# Patient Record
Sex: MALE | URBAN - METROPOLITAN AREA
[De-identification: names, ages, dates, MRNs, and addresses within clinical notes are randomized per-mention and may not be internally consistent; named-entity substitution may affect disease eponyms.]

---

## 2023-02-10 ENCOUNTER — MEDICAL CORRESPONDENCE (OUTPATIENT)
Dept: HEALTH INFORMATION MANAGEMENT | Facility: CLINIC | Age: 68
End: 2023-02-10

## 2023-02-10 ENCOUNTER — LAB REQUISITION (OUTPATIENT)
Dept: LAB | Facility: CLINIC | Age: 68
End: 2023-02-10
Payer: COMMERCIAL

## 2023-02-10 ENCOUNTER — TRANSFERRED RECORDS (OUTPATIENT)
Dept: HEALTH INFORMATION MANAGEMENT | Facility: CLINIC | Age: 68
End: 2023-02-10

## 2023-02-10 DIAGNOSIS — Z11.59 ENCOUNTER FOR SCREENING FOR OTHER VIRAL DISEASES: ICD-10-CM

## 2023-02-10 DIAGNOSIS — R74.8 ABNORMAL LEVELS OF OTHER SERUM ENZYMES: ICD-10-CM

## 2023-02-10 DIAGNOSIS — Z12.5 ENCOUNTER FOR SCREENING FOR MALIGNANT NEOPLASM OF PROSTATE: ICD-10-CM

## 2023-02-10 LAB
ALBUMIN SERPL BCG-MCNC: 4.3 G/DL (ref 3.5–5.2)
ALP SERPL-CCNC: 78 U/L (ref 40–129)
ALT SERPL W P-5'-P-CCNC: 33 U/L (ref 10–50)
ANION GAP SERPL CALCULATED.3IONS-SCNC: 14 MMOL/L (ref 7–15)
AST SERPL W P-5'-P-CCNC: 29 U/L (ref 10–50)
BILIRUB SERPL-MCNC: 0.7 MG/DL
BUN SERPL-MCNC: 12.6 MG/DL (ref 8–23)
CALCIUM SERPL-MCNC: 8.7 MG/DL (ref 8.8–10.2)
CHLORIDE SERPL-SCNC: 103 MMOL/L (ref 98–107)
CHOLEST SERPL-MCNC: 173 MG/DL
CREAT SERPL-MCNC: 1.33 MG/DL (ref 0.67–1.17)
DEPRECATED HCO3 PLAS-SCNC: 22 MMOL/L (ref 22–29)
GFR SERPL CREATININE-BSD FRML MDRD: 59 ML/MIN/1.73M2
GLUCOSE SERPL-MCNC: 94 MG/DL (ref 70–99)
HDLC SERPL-MCNC: 43 MG/DL
LDLC SERPL CALC-MCNC: 100 MG/DL
NONHDLC SERPL-MCNC: 130 MG/DL
POTASSIUM SERPL-SCNC: 4.3 MMOL/L (ref 3.4–5.3)
PROT SERPL-MCNC: 6.9 G/DL (ref 6.4–8.3)
PSA SERPL-MCNC: 0.44 NG/ML (ref 0–4.5)
SODIUM SERPL-SCNC: 139 MMOL/L (ref 136–145)
TRIGL SERPL-MCNC: 148 MG/DL

## 2023-02-10 PROCEDURE — G0103 PSA SCREENING: HCPCS | Mod: ORL | Performed by: FAMILY MEDICINE

## 2023-02-10 PROCEDURE — 80061 LIPID PANEL: CPT | Mod: ORL | Performed by: FAMILY MEDICINE

## 2023-02-10 PROCEDURE — 86803 HEPATITIS C AB TEST: CPT | Mod: ORL | Performed by: FAMILY MEDICINE

## 2023-02-10 PROCEDURE — 80053 COMPREHEN METABOLIC PANEL: CPT | Mod: ORL | Performed by: FAMILY MEDICINE

## 2023-02-13 ENCOUNTER — TRANSCRIBE ORDERS (OUTPATIENT)
Dept: OTHER | Age: 68
End: 2023-02-13

## 2023-02-13 DIAGNOSIS — M54.50 LOW BACK PAIN, UNSPECIFIED: Primary | ICD-10-CM

## 2023-02-13 LAB — HCV AB SERPL QL IA: NONREACTIVE

## 2023-03-23 ENCOUNTER — OFFICE VISIT (OUTPATIENT)
Dept: PHYSICAL MEDICINE AND REHAB | Facility: CLINIC | Age: 68
End: 2023-03-23
Payer: COMMERCIAL

## 2023-03-23 VITALS
DIASTOLIC BLOOD PRESSURE: 83 MMHG | SYSTOLIC BLOOD PRESSURE: 180 MMHG | HEART RATE: 61 BPM | BODY MASS INDEX: 37.65 KG/M2 | HEIGHT: 72 IN | WEIGHT: 278 LBS

## 2023-03-23 DIAGNOSIS — M54.12 CERVICAL RADICULAR PAIN: ICD-10-CM

## 2023-03-23 DIAGNOSIS — M54.50 LUMBAR SPINE PAIN: Primary | ICD-10-CM

## 2023-03-23 DIAGNOSIS — M79.18 MYOFASCIAL PAIN: ICD-10-CM

## 2023-03-23 DIAGNOSIS — M54.2 CERVICAL SPINE PAIN: ICD-10-CM

## 2023-03-23 PROCEDURE — 99204 OFFICE O/P NEW MOD 45 MIN: CPT | Performed by: PHYSICAL MEDICINE & REHABILITATION

## 2023-03-23 RX ORDER — COVID-19 ANTIGEN TEST
220 KIT MISCELLANEOUS 2 TIMES DAILY WITH MEALS
COMMUNITY

## 2023-03-23 ASSESSMENT — PAIN SCALES - GENERAL: PAINLEVEL: MODERATE PAIN (4)

## 2023-03-23 NOTE — LETTER
3/23/2023         RE: Moe Gramajo  [address]  [place] [address]        Dear Colleague,    Thank you for referring your patient, Moe Gramajo, to the [facility]. Please see a copy of my visit note below.    Assessment/Plan:      Moe was seen today for back pain.    Diagnoses and all orders for this visit:    Lumbar spine pain  -     Physical Therapy Referral; Future  -     XR Lumbar Spine 2/3 Views; Future    Myofascial pain  -     Physical Therapy Referral; Future    Cervical spine pain  -     Physical Therapy Referral; Future  -     XR Cervical Spine 2/3 Views; Future    Cervical radicular pain  -     Physical Therapy Referral; Future  -     XR Cervical Spine 2/3 Views; Future    Other orders  -     Spine  Referral         Assessment: Pleasant 67 year old male otherwise healthy with:    1.  20-year history of lumbar spine pain across lumbosacral junction most consistent with facet arthropathy and myofascial pain.  Pain is worsening over the past year.    2.  10-year history of cervical spine pain in the lower cervical and upper thoracic spine with right cervical radicular symptoms/upper extremity paresthesias intermittently.  Neurologically intact on exam.      Discussion:    1.  I discussed the diagnosis and treatment options.  We discussed that no imaging he has not tried physical therapy.  Takes Aleve as needed.    2.  Plain films of the cervical and lumbar spine to evaluate the extent of osteoarthritis/degenerative changes.  If there is significant spondylolisthesis we will likely need MRI.    3.  Start physical therapy for cervical and lumbar strengthening stabilization nerve glides.    4.  Continue Aleve as needed.    5.  Follow-up with me in 6 weeks.  Can consider MRI images if no improvement with therapy.      It was our pleasure caring for your patient today, if there any questions or concerns please do not hesitate to contact us.      Subjective:    Patient ID: Moe Gramajo is a 67 year old male.    History of Present Illness: Patient presents at the request of Dr. Benson for an evaluation of lumbar spine pain as well as cervical spine pain.  His low back pain is the most significant pain.  Rated a 4/10 today.  No injury.  Started over the past 20 years.  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  His back pain is across the lumbosacral junction and mid lumbar spine.  Describes this as a stiffness.  Increased over the past year.  No radiation down the legs paresthesias or weakness.  Worse with walking and bending.  Better with lying down.  Has not had physical therapy or imaging.  Takes Aleve occasionally which helps.    Also has upper back and lower cervical spine pain right greater than left.  Has intermittent paresthesias through the right arm all fingers involved.  This is worse with shoveling snow or mowing the lawn.  Better with rest.  This is intermittent.       Imaging: None available    Review of Systems: Complains of joint pain and poor balance.  Denies fever, weight loss, waking, headache, change in vision, chest pain, shortness of breath, abdominal pain, nausea vomiting, bowel or bladder incontinence, skin itching, sleep issues. Remainder of 12 point review systems negative unless listed above.    History reviewed. No pertinent past medical history.    Family History   Problem Relation Age of Onset     Pacemaker Father          Social History     Socioeconomic History     Marital status: Single     Spouse name: None     Number of children: None     Years of education: None     Highest education level: None   Tobacco Use     Smoking status: Never     Smokeless tobacco: Never       The following portions of the patient's history were reviewed and updated as appropriate: allergies, current medications, past family history, past medical history, past social history, past surgical history and problem list.    Oswestry (JUSTIN)  Questionnaire    OSWESTRY DISABILITY INDEX 3/23/2023   Count 10   Sum 12   Oswestry Score (%) 24       Neck Disability Index:  No flowsheet data found.       PHQ-2 Score:     PHQ-2 ( 1999 Pfizer) 3/23/2023   Q1: Little interest or pleasure in doing things 0   Q2: Feeling down, depressed or hopeless 0   PHQ-2 Score 0                  Objective:   Physical Exam:    BP (!) 180/83   Pulse 61   Ht 6' (1.829 m)   Wt 278 lb (126.1 kg)   BMI 37.70 kg/m    Body mass index is 37.7 kg/m .      General:  Well-appearing male in no acute distress.  Pleasant,   cooperative, and interactive throughout the examination and interview.  CV: No lower extremity edema.  Lymphatics: No cervical lymphadenopathy palpated.  Eyes: sclera clear.  Skin: No rashes or lesions seen.  Respirations unlabored.  MSK: Gait is nonantalgic, cautious.  Able to heel-toe stand without difficulty.    Negative Romberg.  Spine: normal AP curves of the C, T, and L spine.  Mildly reduced lumbar flexion finger to floor testing.  Moderately reduced cervical rotation bilaterally full flexion extension.  Palpation: Tenderness to palpation over lower cervical upper thoracic paraspinals as well as lumbar paraspinals, this tenderness is mild.  Extremities: Full range of motion of the shoulders abduction, elbows, and wrists with no effusions or tenderness to palpation.  Negative arm drop, empty can, and Speed's test bilaterally.    Full range of motion of the hips, knees, and ankles from a seated position with no effusions or tenderness to palpation.    Neurologic exam: Mental status: Patient is alert and oriented with normal affect.  Attention, knowledge, memory, and language are intact.  Normal coordination throughout the examination.  Reflexes are 2+ and symmetric biceps, triceps, brachioradialis, patellar, and Achilles with down-going toes and Negative Aurelia's.  Sensation is intact to light touch throughout the upper and lower extremities bilaterally.  Manual  muscle testing reveals 5 out of 5 strength in the shoulder abductors, elbow   flexors/extensors, wrist extensors, interosseous, and finger flexors; 5 out of 5   in the hip flexors, knee flexors/extensors, ankle plantar flexors, ankle   dorsiflexors, and EHL.  Normal muscle bulk and tone.  Negative   Spurling's test bilaterally.  Negative seated  straight leg raise bilaterally.            Again, thank you for allowing me to participate in the care of your patient.        Sincerely,        Rashaun Barnes, DO

## 2023-03-23 NOTE — PROGRESS NOTES
Assessment/Plan:      Moe was seen today for back pain.    Diagnoses and all orders for this visit:    Lumbar spine pain  -     Physical Therapy Referral; Future  -     XR Lumbar Spine 2/3 Views; Future    Myofascial pain  -     Physical Therapy Referral; Future    Cervical spine pain  -     Physical Therapy Referral; Future  -     XR Cervical Spine 2/3 Views; Future    Cervical radicular pain  -     Physical Therapy Referral; Future  -     XR Cervical Spine 2/3 Views; Future    Other orders  -     Spine  Referral         Assessment: Pleasant 67 year old male otherwise healthy with:    1.  20-year history of lumbar spine pain across lumbosacral junction most consistent with facet arthropathy and myofascial pain.  Pain is worsening over the past year.    2.  10-year history of cervical spine pain in the lower cervical and upper thoracic spine with right cervical radicular symptoms/upper extremity paresthesias intermittently.  Neurologically intact on exam.      Discussion:    1.  I discussed the diagnosis and treatment options.  We discussed that no imaging he has not tried physical therapy.  Takes Aleve as needed.    2.  Plain films of the cervical and lumbar spine to evaluate the extent of osteoarthritis/degenerative changes.  If there is significant spondylolisthesis we will likely need MRI.    3.  Start physical therapy for cervical and lumbar strengthening stabilization nerve glides.    4.  Continue Aleve as needed.    5.  Follow-up with me in 6 weeks.  Can consider MRI images if no improvement with therapy.      It was our pleasure caring for your patient today, if there any questions or concerns please do not hesitate to contact us.      Subjective:   Patient ID: Moe Gramajo is a 67 year old male.    History of Present Illness: Patient presents at the request of Dr. Benson for an evaluation of lumbar spine pain as well as cervical spine pain.  His low back pain is the most significant pain.  Rated a  4/10 today.  No injury.  Started over the past 20 years.  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  His back pain is across the lumbosacral junction and mid lumbar spine.  Describes this as a stiffness.  Increased over the past year.  No radiation down the legs paresthesias or weakness.  Worse with walking and bending.  Better with lying down.  Has not had physical therapy or imaging.  Takes Aleve occasionally which helps.    Also has upper back and lower cervical spine pain right greater than left.  Has intermittent paresthesias through the right arm all fingers involved.  This is worse with shoveling snow or mowing the lawn.  Better with rest.  This is intermittent.       Imaging: None available    Review of Systems: Complains of joint pain and poor balance.  Denies fever, weight loss, waking, headache, change in vision, chest pain, shortness of breath, abdominal pain, nausea vomiting, bowel or bladder incontinence, skin itching, sleep issues. Remainder of 12 point review systems negative unless listed above.    History reviewed. No pertinent past medical history.    Family History   Problem Relation Age of Onset     Pacemaker Father          Social History     Socioeconomic History     Marital status: Single     Spouse name: None     Number of children: None     Years of education: None     Highest education level: None   Tobacco Use     Smoking status: Never     Smokeless tobacco: Never       The following portions of the patient's history were reviewed and updated as appropriate: allergies, current medications, past family history, past medical history, past social history, past surgical history and problem list.    Oswestry (JUSTIN) Questionnaire    OSWESTRY DISABILITY INDEX 3/23/2023   Count 10   Sum 12   Oswestry Score (%) 24       Neck Disability Index:  No flowsheet data found.       PHQ-2 Score:     PHQ-2 ( 1999 Pfizer) 3/23/2023   Q1: Little interest or pleasure in doing things 0   Q2: Feeling down,  depressed or hopeless 0   PHQ-2 Score 0                  Objective:   Physical Exam:    BP (!) 180/83   Pulse 61   Ht 6' (1.829 m)   Wt 278 lb (126.1 kg)   BMI 37.70 kg/m    Body mass index is 37.7 kg/m .      General:  Well-appearing male in no acute distress.  Pleasant,   cooperative, and interactive throughout the examination and interview.  CV: No lower extremity edema.  Lymphatics: No cervical lymphadenopathy palpated.  Eyes: sclera clear.  Skin: No rashes or lesions seen.  Respirations unlabored.  MSK: Gait is nonantalgic, cautious.  Able to heel-toe stand without difficulty.    Negative Romberg.  Spine: normal AP curves of the C, T, and L spine.  Mildly reduced lumbar flexion finger to floor testing.  Moderately reduced cervical rotation bilaterally full flexion extension.  Palpation: Tenderness to palpation over lower cervical upper thoracic paraspinals as well as lumbar paraspinals, this tenderness is mild.  Extremities: Full range of motion of the shoulders abduction, elbows, and wrists with no effusions or tenderness to palpation.  Negative arm drop, empty can, and Speed's test bilaterally.    Full range of motion of the hips, knees, and ankles from a seated position with no effusions or tenderness to palpation.    Neurologic exam: Mental status: Patient is alert and oriented with normal affect.  Attention, knowledge, memory, and language are intact.  Normal coordination throughout the examination.  Reflexes are 2+ and symmetric biceps, triceps, brachioradialis, patellar, and Achilles with down-going toes and Negative Aurelia's.  Sensation is intact to light touch throughout the upper and lower extremities bilaterally.  Manual muscle testing reveals 5 out of 5 strength in the shoulder abductors, elbow   flexors/extensors, wrist extensors, interosseous, and finger flexors; 5 out of 5   in the hip flexors, knee flexors/extensors, ankle plantar flexors, ankle   dorsiflexors, and EHL.  Normal muscle bulk  and tone.  Negative   Spurling's test bilaterally.  Negative seated  straight leg raise bilaterally.

## 2023-03-23 NOTE — PATIENT INSTRUCTIONS
An xray was ordered for you today.  Please call Radiology at [phone].    A physical therapy order was provided for you today.  You will be contacted by physical therapy.  If nobody contacts you within 3 to 5 days, please contact the clinic at [phone].  It will be very important for you to do your physical therapy exercises on a regular basis to decrease your pain and prevent future pain flares.   
LR

## 2023-03-29 ENCOUNTER — TELEPHONE (OUTPATIENT)
Dept: PHYSICAL MEDICINE AND REHAB | Facility: CLINIC | Age: 68
End: 2023-03-29
Payer: COMMERCIAL

## 2023-03-29 ENCOUNTER — HOSPITAL ENCOUNTER (OUTPATIENT)
Dept: GENERAL RADIOLOGY | Facility: HOSPITAL | Age: 68
Discharge: HOME | End: 2023-03-29
Attending: PHYSICAL MEDICINE & REHABILITATION
Payer: COMMERCIAL

## 2023-03-29 DIAGNOSIS — M54.12 CERVICAL RADICULAR PAIN: ICD-10-CM

## 2023-03-29 DIAGNOSIS — M54.2 CERVICAL SPINE PAIN: ICD-10-CM

## 2023-03-29 DIAGNOSIS — M54.50 LUMBAR SPINE PAIN: ICD-10-CM

## 2023-03-29 PROCEDURE — 72100 X-RAY EXAM L-S SPINE 2/3 VWS: CPT

## 2023-03-29 PROCEDURE — 72040 X-RAY EXAM NECK SPINE 2-3 VW: CPT

## 2023-03-29 NOTE — TELEPHONE ENCOUNTER
----- Message from Rashaun Barnes DO sent at 3/29/2023 12:00 PM CDT -----  I reviewed the x-rays of the patient's neck and low back.      His cervical spine/neck x-rays show me some mild wear-and-tear changes C 5-6 and 6-7 discs and a likely a fused C4-5 level.  This may have been congenital or from wear and tear on the C4-5 disc.    His lumbar spine x-ray shows some mild wear-and-tear changes through the lower lumbar spine and more moderate at the L5-S1 level.    I recommend he continue with the plan of physical therapy

## 2023-03-29 NOTE — TELEPHONE ENCOUNTER
Call placed to patient with provider's results and recommendations.  Pt stated understanding. Pt will begin PT tomorrow 3/30.

## 2023-03-30 ENCOUNTER — HOSPITAL ENCOUNTER (OUTPATIENT)
Dept: PHYSICAL THERAPY | Facility: REHABILITATION | Age: 68
Discharge: HOME | End: 2023-03-30
Attending: PHYSICAL MEDICINE & REHABILITATION
Payer: COMMERCIAL

## 2023-03-30 DIAGNOSIS — M79.18 MYOFASCIAL PAIN: ICD-10-CM

## 2023-03-30 DIAGNOSIS — M54.50 LUMBAR SPINE PAIN: Primary | ICD-10-CM

## 2023-03-30 DIAGNOSIS — M54.2 CERVICAL SPINE PAIN: ICD-10-CM

## 2023-03-30 DIAGNOSIS — M54.12 CERVICAL RADICULAR PAIN: ICD-10-CM

## 2023-03-30 PROCEDURE — 97161 PT EVAL LOW COMPLEX 20 MIN: CPT | Mod: GP | Performed by: PHYSICAL THERAPIST

## 2023-03-30 PROCEDURE — 97110 THERAPEUTIC EXERCISES: CPT | Mod: GP | Performed by: PHYSICAL THERAPIST

## 2023-03-30 NOTE — PROGRESS NOTES
[facility]    OUTPATIENT PHYSICAL THERAPY ORTHOPEDIC EVALUATION  PLAN OF TREATMENT FOR OUTPATIENT REHABILITATION  (COMPLETE FOR INITIAL CLAIMS ONLY)  Patient's Last Name, First Name, M.I.  YOB: 1955  Moe Gramajo    Provider s Name:  [facility]   Medical Record No.  [id number]   Start of Care Date:  03/30/23   Onset Date:  03/23/23 (Order date)   Type:     _X__PT   ___OT   ___SLP Medical Diagnosis:  Lumbar spine pain  Myofascial pain  Cervical spine pain  Cervical radicular pain     PT Diagnosis:  Chronic lower back and neck pain secondary to degenerative changes and facet arthropathy   Visits from SOC:  1      _________________________________________________________________________________  Plan of Treatment/Functional Goals:  balance training, joint mobilization, manual therapy, gait training, neuromuscular re-education, ROM, strengthening, stretching     Electrical stimulation     Goals  Goal Identifier: Self-management/HEP  Goal Description: Patient will be independent in self-management of condition and HEP to reach functional goals.  Target Date: 06/22/23    Goal Identifier: Sleeping  Goal Description: Patient will be able to sleep throughout the night without waking d/t pain in the neck to improve QOL.  Target Date: 06/22/23    Goal Identifier: Bending  Goal Description: Patient will be able to bend down to  objects from the floor with <2/10 pain in the lower back to improve functional mobility.  Target Date: 06/22/23    Goal Identifier: Standing  Goal Description: Patient will be able to stand for 30-45 minutes to perform cooking and dishes with <2/10 pain in the lower back to demonstrate improved functional independence.  Target Date: 06/22/23    Therapy Frequency:  1 time/week  Predicted Duration of Therapy Intervention:  12 weeks    Marisabel SIMS  Mike, PT, DPT, MHA                 I CERTIFY THE NEED FOR THESE SERVICES FURNISHED UNDER        THIS PLAN OF TREATMENT AND WHILE UNDER MY CARE     (Physician co-signature of this document indicates review and certification of the therapy plan).                     Certification Date From:  03/30/23   Certification Date To:  06/22/23    Referring Provider:  Rashaun Barnes DO    Initial Assessment        See Epic Evaluation Start of Care Date: 03/30/23 03/30/23 1100   General Information   Type of Visit Initial OP Ortho PT Evaluation   Start of Care Date 03/30/23   Referring Physician Rashaun Barnes DO   Orders Evaluate and Treat   Date of Order 03/23/23   Certification Required? Yes   Medical Diagnosis Lumbar spine pain  Myofascial pain  Cervical spine pain  Cervical radicular pain   Surgical/Medical history reviewed Yes   Precautions/Limitations no known precautions/limitations       Present No   Body Part(s)   Body Part(s) Cervical Spine;Lumbar Spine/SI   Presentation and Etiology   Pertinent history of current problem (include personal factors and/or comorbidities that impact the POC) The patient reports that his back and neck pain have been getting worse.  It has been impacting his ability to walk longer distances.  He also gets headaches sometimes.  He can get tingling in his R UE when shoveling or mowing the lawn, which lasts 15 minutes or so.  Patient has pain in the L leg when walking.  Patient does report a L hip dislocation in his history.   Impairments A. Pain;D. Decreased ROM;E. Decreased flexibility;F. Decreased strength and endurance;G. Impaired balance;L. Tingling;N. Headaches   Functional Limitations perform activities of daily living;perform desired leisure / sports activities   Symptom Location Middle lower back, between shoulder blades   How/Where did it occur From insidious onset   Onset date of current episode/exacerbation 03/23/23  (Order date)    Chronicity Chronic   Pain rating (0-10 point scale) Best (/10);Worst (/10)   Best (/10) 3   Worst (/10) 7   Pain quality H. Other   Pain quality comment Stiff, dull, sharp occasionally   Frequency of pain/symptoms C. With activity   Pain/symptoms are: Worse in the morning   Pain/symptoms exacerbated by A. Sitting;M. Other   Pain exacerbation comment Standing for long periods, bending forward, sleeping on the right side for the neck   Pain/symptoms eased by G. Heat;H. Cold;I. OTC medication(s);C. Rest;E. Changing positions   Progression of symptoms since onset: Worsened   Current / Previous Interventions   Diagnostic Tests: X-ray   X-ray Results Results   X-ray results Cervical x-ray: Straightening of the usual cervical lordosis without subluxation. Normal vertebral body heights. No prevertebral soft tissue swelling. Severe interspace narrowing at C4-C5 where there may be partial ankylosis of the interspace. Mild to moderate degenerative interspace narrowing C5-C6 and C6 C7. Mild lower cervical facet arthropathy.  Lumbar x-ray: 5 lumbar vertebral bodies. Slight S-shaped curvature. No subluxation. Mild age-indeterminate height loss of the L1 endplates. Moderate degenerative interspace narrowing at L3-L4 and L5-S1. Mild lower lumbar spine facet arthropathy.   Current Level of Function   Patient role/employment history F. Retired   Living environment [place]   Fall Risk Screen   Fall screen completed by PT   Have you fallen 2 or more times in the past year? No   Have you fallen and had an injury in the past year? No   Timed Up and Go score (seconds) 13.25   Is patient a fall risk? Yes   Fall screen comments Patient provided falls prevention handout for home.  Recommended patient use his cane for added stability when ambulating.   Abuse Screen (yes response referral indicated)   Feels Unsafe at Home or Work/School no   Feels Threatened by Someone no   Does Anyone Try to Keep You From Having Contact with Others  or Doing Things Outside Your Home? no   Physical Signs of Abuse Present no   Patient needs abuse support services and resources No   System Outcome Measures   Outcome Measures Low Back Pain (see Oswestry and Jones)  (JUSTIN: 24%)   Lumbar Spine/SI Objective Findings   Gait/Locomotion Patient appears unsteady on his feet   Flexion ROM To mid shin without pain   Extension ROM WNL   Right Side Bending ROM WNL   Left Side Bending ROM WNL   Segmental Mobility Hypomobility thorughout lumbar spine without pain   Palpation Increased muscle tone bilateral lumbar paraspinal musculature   Slump Test (-)   Posture Increase lumbar lordosis   Cervical Spine   Cervical Left Side Bending ROM 11 with stiffness   Cervical Right Rotation ROM 56 with pain in the upper back   Cervical Left Rotation ROM 58   Cervical Flexion ROM 46   Cervical Extension ROM 40   Cervical Right Side Bending ROM 20 with stiffness   Shoulder AROM Screen WNL bilaterally   Spurling Test Pain R, no radicular symptoms   Segmental Mobility-Cervical Hypomobility throughout cervical spine   Palpation Increase muscle tone throughout UT, levator, cervical paraspinals bilaterally   UE Neural Tension UE Neural Tension Tests   Posture Mod forward head, increased thoracic kyphosis   ULTT I (Median and Anterior Interosseous) Negative   Planned Therapy Interventions   Planned Therapy Interventions balance training;joint mobilization;manual therapy;gait training;neuromuscular re-education;ROM;strengthening;stretching   Planned Modality Interventions   Planned Modality Interventions Electrical stimulation   Clinical Impression   Criteria for Skilled Therapeutic Interventions Met yes, treatment indicated   PT Diagnosis Chronic lower back and neck pain secondary to degenerative changes and facet arthropathy   Influenced by the following impairments ROM, pain, flexibility, strength, posture, segmental mobility, balance   Functional limitations due to impairments Standing, sitting,  sleeping, walking, bending   Clinical Presentation Stable/Uncomplicated   Clinical Decision Making (Complexity) Low complexity   Therapy Frequency 1 time/week   Predicted Duration of Therapy Intervention (days/wks) 12 weeks   Risk & Benefits of therapy have been explained Yes   Patient, Family & other staff in agreement with plan of care Yes   Clinical Impression Comments Moe Gramajo is a 67 year old male who presents to PT with c/o lower back and neck pain for the last 10-20 years.  Patient does report hx of L hip dislocation but unable to confirm with medical records.  His pain is described as dull and stiff, but can be sharp at times.  Pain is rated 3-7/10.  He is having difficulty with sleeping d/t neck pain, and standing, walking, sitting, and bending d/t lower back pain.  On exam, patient demonstrates impaired cervical ROM, segmental hypomobility in the lumbar and cervical regions, and increased muscle tone throughout the lumbar and cervical regions as well.  Patient also demonstrates impaired balance as evidenced by performing TUG in 13.25 seconds.  His symptoms are most consistent with degenerative changes and facet arthropathy, confirmed with x-ray, as well as impaired balance.  He is appropriate for skilled 1:1 PT services to address the listed impairments and return to function.   ORTHO GOALS   PT Ortho Eval Goals 1;2;3;4   Ortho Goal 1   Goal Identifier Self-management/HEP   Goal Description Patient will be independent in self-management of condition and HEP to reach functional goals.   Target Date 06/22/23   Ortho Goal 2   Goal Identifier Sleeping   Goal Description Patient will be able to sleep throughout the night without waking d/t pain in the neck to improve QOL.   Target Date 06/22/23   Ortho Goal 3   Goal Identifier Bending   Goal Description Patient will be able to bend down to  objects from the floor with <2/10 pain in the lower back to improve functional mobility.   Target Date 06/22/23    Ortho Goal 4   Goal Identifier Standing   Goal Description Patient will be able to stand for 30-45 minutes to perform cooking and dishes with <2/10 pain in the lower back to demonstrate improved functional independence.   Target Date 06/22/23   Total Evaluation Time   PT Eval, Low Complexity Minutes ([id number]) 32   Therapy Certification   Certification date from 03/30/23   Certification date to 06/22/23   Medical Diagnosis Lumbar spine pain  Myofascial pain  Cervical spine pain  Cervical radicular pain     Marisabel Mckeon, PT, DPT, A  3/30/2023

## 2023-05-01 ENCOUNTER — HOSPITAL ENCOUNTER (OUTPATIENT)
Dept: PHYSICAL THERAPY | Facility: REHABILITATION | Age: 68
Discharge: HOME | End: 2023-05-01
Payer: COMMERCIAL

## 2023-05-01 DIAGNOSIS — M79.18 MYOFASCIAL PAIN: ICD-10-CM

## 2023-05-01 DIAGNOSIS — M54.12 CERVICAL RADICULAR PAIN: ICD-10-CM

## 2023-05-01 DIAGNOSIS — M54.2 CERVICAL SPINE PAIN: ICD-10-CM

## 2023-05-01 DIAGNOSIS — M54.50 LUMBAR SPINE PAIN: Primary | ICD-10-CM

## 2023-05-01 PROCEDURE — 97116 GAIT TRAINING THERAPY: CPT | Mod: GP | Performed by: PHYSICAL THERAPIST

## 2023-05-01 PROCEDURE — 97110 THERAPEUTIC EXERCISES: CPT | Mod: GP | Performed by: PHYSICAL THERAPIST

## 2023-05-04 ENCOUNTER — OFFICE VISIT (OUTPATIENT)
Dept: PHYSICAL MEDICINE AND REHAB | Facility: CLINIC | Age: 68
End: 2023-05-04
Payer: COMMERCIAL

## 2023-05-04 VITALS — SYSTOLIC BLOOD PRESSURE: 185 MMHG | HEART RATE: 61 BPM | DIASTOLIC BLOOD PRESSURE: 89 MMHG

## 2023-05-04 DIAGNOSIS — M54.50 LUMBAR SPINE PAIN: ICD-10-CM

## 2023-05-04 DIAGNOSIS — M54.2 CERVICAL SPINE PAIN: Primary | ICD-10-CM

## 2023-05-04 DIAGNOSIS — M43.22 CERVICAL SPINE ANKYLOSIS: ICD-10-CM

## 2023-05-04 DIAGNOSIS — M50.30 DDD (DEGENERATIVE DISC DISEASE), CERVICAL: ICD-10-CM

## 2023-05-04 DIAGNOSIS — M51.369 DDD (DEGENERATIVE DISC DISEASE), LUMBAR: ICD-10-CM

## 2023-05-04 PROCEDURE — 99213 OFFICE O/P EST LOW 20 MIN: CPT | Performed by: PHYSICAL MEDICINE & REHABILITATION

## 2023-05-04 ASSESSMENT — PAIN SCALES - GENERAL: PAINLEVEL: NO PAIN (1)

## 2023-05-04 NOTE — PROGRESS NOTES
Assessment/Plan:      Moe was seen today for back pain.    Diagnoses and all orders for this visit:    Cervical spine pain    DDD (degenerative disc disease), cervical    Cervical spine ankylosis    Lumbar spine pain    DDD (degenerative disc disease), lumbar         Assessment: Pleasant 67 year old male otherwise healthy with:    1.   Improving 10-year history of cervical spine pain in the lower cervical and upper thoracic spine with right cervical radicular symptoms/upper extremity paresthesias intermittently. Severe degenerative disc changes on plain films at C4-5 with ankylosis and moderate at C5-6 and C6-7.  Neurologically intact on exam.   Up to 50% improvement with 2 visits of physical therapy and home exercises.    2. 20-year history of lumbar spine pain across lumbosacral junction most consistent with facet arthropathy and myofascial pain. He has moderate degenerative disc changes on plain fiolms at 4-5 and L5-S1.  Up to 50% improved with physical therapy and doing home exercises 4 times daily.        Discussion:    1.  We discussed the plain films of his cervical and lumbar spine.  He is working with physical therapy and doing quite well.  Up to 50% improved after only 2 visits but continues to do exercises 4 times daily.  We discussed that there is no need for medication changes or further imaging at this time given his improvement with therapy and he agrees.      2.  Continue with physical therapy and doing home exercises.    3.  Follow-up with me as needed after completing physical therapy.      It was our pleasure caring for your patient today, if there any questions or concerns please do not hesitate to contact us.      Subjective:   Patient ID: Moe Gramajo is a 67 year old male.    History of Present Illness: Patient presents for follow-up of cervical and lumbar spine pain.  He continues to have C-spine and lumbar spine pain through the mid cervical spine mid lumbar spine no radiation down the arms  or legs today paresthesias or weakness.  He is doing very well with home exercises from physical therapy.  Has been to 2 physical therapy visits. I reviewed the notes from physical therapy.  Has been to 2 visits.  Has good understanding of home exercise programs.  He reports he is doing his exercises 4 times daily.  Reports significantly reduced stiffness in the neck and lumbar spine 50% improved at a minimum.  Takes Aleve as needed or Excedrin as needed for pain.    Imaging: Lumbar spine plain films imaging and cervical spine spine plain film imaging images and report personally reviewed for medical decision-making purposes.  Lumbar spine shows slight S-shaped scoliotic curve mild age indeterminate L1 vertebral body height loss with moderate degenerative changes L3-4 and L5-S1.  Cervical spine reveals severe disc height narrowing C4-5 partial ankylosis potentially.  Mild to moderate degenerative changes C5-6 and 6-7.    Review of Systems: None    No past medical history on file.    The following portions of the patient's history were reviewed and updated as appropriate: allergies, current medications, past family history, past medical history, past social history, past surgical history and problem list.           Objective:   Physical Exam:    BP (!) 185/89   Pulse 61   There is no height or weight on file to calculate BMI.      General: Alert and oriented with normal affect. Attention, knowledge, memory, and language are intact. No acute distress.      Gait:  Nonantalgic  No tenderness of the cervical spine or lumbar paraspinals.  Sensation is intact to light touch throughout the upper and lower extremities.  Reflexes are   negative Hoffmans.      Manual muscle testing reveals:  Right /Left out of 5     5/5 elbow flexors  5/5 elbow extensors  5/5 wrist extensors  5/5 interosseus  5/5 finger flexors     5/5 ankle plantar flexors  5/5 ankle dorsiflexors  5/5  EHL

## 2023-05-04 NOTE — PATIENT INSTRUCTIONS
Continue with physical therapy and home exercises  After you have completed physical therapy, if your are still having issues, you can schedule a follow up with Dr Barnes

## 2023-05-04 NOTE — LETTER
5/4/2023         RE: Moe Gramajo  [address]  [place] [address]        Dear Colleague,    Thank you for referring your patient, Moe Gramajo, to the [facility]. Please see a copy of my visit note below.    Assessment/Plan:      Moe was seen today for back pain.    Diagnoses and all orders for this visit:    Cervical spine pain    DDD (degenerative disc disease), cervical    Cervical spine ankylosis    Lumbar spine pain    DDD (degenerative disc disease), lumbar         Assessment: Pleasant 67 year old male otherwise healthy with:    1.   Improving 10-year history of cervical spine pain in the lower cervical and upper thoracic spine with right cervical radicular symptoms/upper extremity paresthesias intermittently. Severe degenerative disc changes on plain films at C4-5 with ankylosis and moderate at C5-6 and C6-7.  Neurologically intact on exam.   Up to 50% improvement with 2 visits of physical therapy and home exercises.    2. 20-year history of lumbar spine pain across lumbosacral junction most consistent with facet arthropathy and myofascial pain. He has moderate degenerative disc changes on plain fiolms at 4-5 and L5-S1.  Up to 50% improved with physical therapy and doing home exercises 4 times daily.        Discussion:    1.  We discussed the plain films of his cervical and lumbar spine.  He is working with physical therapy and doing quite well.  Up to 50% improved after only 2 visits but continues to do exercises 4 times daily.  We discussed that there is no need for medication changes or further imaging at this time given his improvement with therapy and he agrees.      2.  Continue with physical therapy and doing home exercises.    3.  Follow-up with me as needed after completing physical therapy.      It was our pleasure caring for your patient today, if there any questions or concerns please do not hesitate to contact us.      Subjective:   Patient ID: Moe LOVE  Avila is a 67 year old male.    History of Present Illness: Patient presents for follow-up of cervical and lumbar spine pain.  He continues to have C-spine and lumbar spine pain through the mid cervical spine mid lumbar spine no radiation down the arms or legs today paresthesias or weakness.  He is doing very well with home exercises from physical therapy.  Has been to 2 physical therapy visits. I reviewed the notes from physical therapy.  Has been to 2 visits.  Has good understanding of home exercise programs.  He reports he is doing his exercises 4 times daily.  Reports significantly reduced stiffness in the neck and lumbar spine 50% improved at a minimum.  Takes Aleve as needed or Excedrin as needed for pain.    Imaging: Lumbar spine plain films imaging and cervical spine spine plain film imaging images and report personally reviewed for medical decision-making purposes.  Lumbar spine shows slight S-shaped scoliotic curve mild age indeterminate L1 vertebral body height loss with moderate degenerative changes L3-4 and L5-S1.  Cervical spine reveals severe disc height narrowing C4-5 partial ankylosis potentially.  Mild to moderate degenerative changes C5-6 and 6-7.    Review of Systems: None    No past medical history on file.    The following portions of the patient's history were reviewed and updated as appropriate: allergies, current medications, past family history, past medical history, past social history, past surgical history and problem list.           Objective:   Physical Exam:    BP (!) 185/89   Pulse 61   There is no height or weight on file to calculate BMI.      General: Alert and oriented with normal affect. Attention, knowledge, memory, and language are intact. No acute distress.      Gait:  Nonantalgic  No tenderness of the cervical spine or lumbar paraspinals.  Sensation is intact to light touch throughout the upper and lower extremities.  Reflexes are   negative Hoffmans.      Manual muscle  testing reveals:  Right /Left out of 5     5/5 elbow flexors  5/5 elbow extensors  5/5 wrist extensors  5/5 interosseus  5/5 finger flexors     5/5 ankle plantar flexors  5/5 ankle dorsiflexors  5/5  EHL          Again, thank you for allowing me to participate in the care of your patient.        Sincerely,        Rashaun Barnes, DO

## 2023-05-11 ENCOUNTER — HOSPITAL ENCOUNTER (OUTPATIENT)
Dept: PHYSICAL THERAPY | Facility: REHABILITATION | Age: 68
Discharge: HOME | End: 2023-05-11
Payer: COMMERCIAL

## 2023-05-11 PROCEDURE — 97112 NEUROMUSCULAR REEDUCATION: CPT | Mod: GP

## 2023-05-11 PROCEDURE — 97116 GAIT TRAINING THERAPY: CPT | Mod: GP

## 2023-05-11 PROCEDURE — 97110 THERAPEUTIC EXERCISES: CPT | Mod: GP

## 2023-05-18 ENCOUNTER — THERAPY VISIT (OUTPATIENT)
Dept: PHYSICAL THERAPY | Facility: REHABILITATION | Age: 68
End: 2023-05-18
Payer: COMMERCIAL

## 2023-05-18 DIAGNOSIS — M54.2 CERVICAL SPINE PAIN: ICD-10-CM

## 2023-05-18 DIAGNOSIS — M79.18 MYOFASCIAL PAIN: ICD-10-CM

## 2023-05-18 DIAGNOSIS — M54.50 LUMBAR SPINE PAIN: Primary | ICD-10-CM

## 2023-05-18 DIAGNOSIS — M54.12 CERVICAL RADICULAR PAIN: ICD-10-CM

## 2023-05-18 PROCEDURE — 97140 MANUAL THERAPY 1/> REGIONS: CPT | Mod: GP | Performed by: PHYSICAL THERAPIST

## 2023-05-18 PROCEDURE — 97110 THERAPEUTIC EXERCISES: CPT | Mod: GP | Performed by: PHYSICAL THERAPIST

## 2023-05-25 ENCOUNTER — THERAPY VISIT (OUTPATIENT)
Dept: PHYSICAL THERAPY | Facility: REHABILITATION | Age: 68
End: 2023-05-25
Payer: COMMERCIAL

## 2023-05-25 DIAGNOSIS — M54.2 CERVICAL SPINE PAIN: ICD-10-CM

## 2023-05-25 DIAGNOSIS — M79.18 MYOFASCIAL PAIN: ICD-10-CM

## 2023-05-25 DIAGNOSIS — M54.12 CERVICAL RADICULAR PAIN: ICD-10-CM

## 2023-05-25 DIAGNOSIS — M54.50 LUMBAR SPINE PAIN: Primary | ICD-10-CM

## 2023-05-25 PROCEDURE — 97140 MANUAL THERAPY 1/> REGIONS: CPT | Mod: GP | Performed by: PHYSICAL THERAPIST

## 2023-05-25 PROCEDURE — 97110 THERAPEUTIC EXERCISES: CPT | Mod: GP | Performed by: PHYSICAL THERAPIST

## 2023-05-31 ENCOUNTER — THERAPY VISIT (OUTPATIENT)
Dept: PHYSICAL THERAPY | Facility: REHABILITATION | Age: 68
End: 2023-05-31
Payer: COMMERCIAL

## 2023-05-31 DIAGNOSIS — M54.12 CERVICAL RADICULAR PAIN: ICD-10-CM

## 2023-05-31 DIAGNOSIS — M54.2 CERVICAL SPINE PAIN: ICD-10-CM

## 2023-05-31 DIAGNOSIS — M79.18 MYOFASCIAL PAIN: ICD-10-CM

## 2023-05-31 DIAGNOSIS — M54.50 LUMBAR SPINE PAIN: Primary | ICD-10-CM

## 2023-05-31 PROCEDURE — 97110 THERAPEUTIC EXERCISES: CPT | Mod: GP | Performed by: PHYSICAL THERAPIST

## 2023-06-08 ENCOUNTER — THERAPY VISIT (OUTPATIENT)
Dept: PHYSICAL THERAPY | Facility: REHABILITATION | Age: 68
End: 2023-06-08
Payer: COMMERCIAL

## 2023-06-08 DIAGNOSIS — M54.50 BILATERAL LOW BACK PAIN WITHOUT SCIATICA: Primary | ICD-10-CM

## 2023-06-08 PROCEDURE — 97110 THERAPEUTIC EXERCISES: CPT | Mod: GP

## 2023-06-08 NOTE — PROGRESS NOTES
DISCHARGE  Reason for Discharge: Patient has met all goals.    Equipment Issued: none    Discharge Plan: Patient to continue home program.    Referring Provider:  Rashaun Barnes       06/08/23 0500   Appointment Info   Signing clinician's name / credentials Bria Shah, PT, DPT   Total/Authorized Visits 12   Visits Used 7/12   Medical Diagnosis Lumbar spine pain  Myofascial pain  Cervical spine pain  Cervical radicular pain   PT Tx Diagnosis Chronic lower back and neck pain secondary to degenerative changes and facet arthropathy   Precautions/Limitations None   Quick Adds Certification   Progress Note/Certification   Start of Care Date 03/30/23   Onset of illness/injury or Date of Surgery 03/23/23   Therapy Frequency 1x/week   Predicted Duration 12 weeks   Certification date from 03/30/23   Certification date to 06/22/23       Present No   GOALS   PT Goals 2;3;4   PT Goal 1   Goal Identifier Self-management/HEP   Goal Description Patient will be independent in self-management of condition and HEP to reach functional goals.   Goal Progress Met   Target Date 06/22/23   PT Goal 2   Goal Identifier Sleeping   Goal Description Patient will be able to sleep throughout the night without waking d/t pain in the neck to improve QOL.   Goal Progress Met   Target Date 06/22/23   Date Met 05/25/23   PT Goal 3   Goal Identifier Bending   Goal Description Patient will be able to bend down to  objects from the floor with <2/10 pain in the lower back to improve functional mobility.   Goal Progress Met   Target Date 06/22/23   PT Goal 4   Goal Identifier Standing   Goal Description Patient will be able to stand for 30-45 minutes to perform cooking and dishes with <2/10 pain in the lower back to demonstrate improved functional independence.   Goal Progress Met   Target Date 06/22/23   Date Met 06/08/23   Subjective Report   Subjective Report Pt arrives feeling like everything has gotten a lot better since  starting PT. Has not been able to go walking due to air and humidity. Has been able to regularly HEP at home 3x/day and feels like the newest ones are still challenging. Was able to attend car show and was out for 20 minutes, felt a little stiff but otherwise no pain.   Objective Measures   Objective Measures Objective Measure 1;Objective Measure 2;Objective Measure 3   Objective Measure 1   Objective Measure Cervical ROM   Details WNL; 60 degrees bilateral rotation ROM   Objective Measure 2   Objective Measure Pain rating   Details 0/10 in neck and lower back   Objective Measure 3   Objective Measure Lumbar ROM   Details WFL without pain or stiffness   Treatment Interventions (PT)   Interventions Therapeutic Procedure/Exercise;Manual Therapy;Gait Training;Neuromuscular Re-education   Therapeutic Procedure/Exercise   Therapeutic Procedures: strength, endurance, ROM, flexibillity minutes (15679) 40   Skilled Intervention Updated HEP, Patient education, Verbal and tactile cues utilized to facilitate correct exercise technique   Patient Response/Progress Good understanding of HEP and able to tolerate well   Ther Proc 1 - Details To promote axial trunk rotation and decrease pain: NUSTEP x 8 minutes WL 5.0 with subjective measures taken and 510 steps on NUSTEP.  For improved lumbar strength and mobility - challenged pt with more standing exercises: FIS x 10, EIS x 10. Standing hip abduction x 10 each side. Pallof press with green TB x 10 each direction. STS overhead with 10lb weights. Farmer walk 30'' x 3 with 20lbs. Lunges x 10, cues for upright and engaged core. Woodchops with green TB x 10 each direction. Slouch overcorrect x 10. Seated trunk rotations x 10 each side. Educated pt on continuing HEP and symtom management in the future.   Education   Learner/Method Patient   Plan   Homework PTRx   Home program PPT, LTR, KTC stretch, cervical retractions   Plan for next session DC   Comments   Comments The patient  presents to PT for follow up visit regarding neck and lower back pain.  He demonstrates significant improvements in his pain level and function since beginning PT. This session finalized HEP and challenged with standing exercises. Tolerated well and met all goals. Approriate to DC.   Total Session Time   Timed Code Treatment Minutes 40   Total Treatment Time (sum of timed and untimed services) 40   Medicare Claim Information   Medical Diagnosis Lumbar spine pain  Myofascial pain  Cervical spine pain  Cervical radicular pain   PT Diagnosis Chronic lower back and neck pain secondary to degenerative changes and facet arthropathy   Start of Care Date 03/30/23   Onset date of current episode/exacerbation 03/23/23  (Order date)   Certification date from 03/30/23   Ortho Goal 1   Goal Identifier Self-management/HEP   Goal Description Patient will be independent in self-management of condition and HEP to reach functional goals.   Target Date 06/22/23   Ortho Goal 2   Goal Identifier Sleeping   Goal Description Patient will be able to sleep throughout the night without waking d/t pain in the neck to improve QOL.   Target Date 06/22/23   Ortho Goal 3   Goal Identifier Bending   Goal Description Patient will be able to bend down to  objects from the floor with <2/10 pain in the lower back to improve functional mobility.   Target Date 06/22/23   Ortho Goal 4   Goal Identifier Standing   Goal Description Patient will be able to stand for 30-45 minutes to perform cooking and dishes with <2/10 pain in the lower back to demonstrate improved functional independence.   Target Date 06/22/23   STEPHANY REYES, PT